# Patient Record
Sex: FEMALE | Race: WHITE
[De-identification: names, ages, dates, MRNs, and addresses within clinical notes are randomized per-mention and may not be internally consistent; named-entity substitution may affect disease eponyms.]

---

## 2021-01-12 ENCOUNTER — HOSPITAL ENCOUNTER (EMERGENCY)
Dept: HOSPITAL 61 - ER | Age: 46
Discharge: HOME | End: 2021-01-12
Payer: SELF-PAY

## 2021-01-12 VITALS — BODY MASS INDEX: 44.41 KG/M2 | HEIGHT: 64 IN | WEIGHT: 260.15 LBS

## 2021-01-12 VITALS — DIASTOLIC BLOOD PRESSURE: 87 MMHG | SYSTOLIC BLOOD PRESSURE: 181 MMHG

## 2021-01-12 DIAGNOSIS — J02.9: Primary | ICD-10-CM

## 2021-01-12 DIAGNOSIS — Z90.89: ICD-10-CM

## 2021-01-12 DIAGNOSIS — F17.200: ICD-10-CM

## 2021-01-12 DIAGNOSIS — K21.9: ICD-10-CM

## 2021-01-12 DIAGNOSIS — Z90.49: ICD-10-CM

## 2021-01-12 DIAGNOSIS — Z98.51: ICD-10-CM

## 2021-01-12 DIAGNOSIS — L53.9: ICD-10-CM

## 2021-01-12 PROCEDURE — 87880 STREP A ASSAY W/OPTIC: CPT

## 2021-01-12 PROCEDURE — 99283 EMERGENCY DEPT VISIT LOW MDM: CPT

## 2021-01-12 PROCEDURE — 87070 CULTURE OTHR SPECIMN AEROBIC: CPT

## 2021-01-12 NOTE — ED.ADGEN
Past Medical History


Past Medical History:  GERD


Past Surgical History:  Appendectomy, Cholecystectomy, Tonsillectomy, Tubal 

ligation


Smoking Status:  Current Every Day Smoker


Alcohol Use:  Occasionally


Drug Use:  None





General Adult


EDM:


Chief Complaint:  SORE THROAT





HPI:


HPI:





Patient is a 45  year old female who presents emergency department with 

complaints of a sore throat for the last week and a half.  She denies any 

difficulty speaking or swallowing.  Patient states it is very painful when she 

swallows.  She denies any fever, ear pain, shortness of breath, wheezing, cough,

body aches, fatigue, headache, abdominal pain, nausea, vomiting, or diarrhea.  

She currently rates pain a 10 out of 10 on the pain scale, she denies any 

alleviating factors.





Review of Systems:


Review of Systems:


Complete ROS is negative unless otherwise noted in HPI.





Allergies:


Allergies:





Allergies








Coded Allergies Type Severity Reaction Last Updated Verified


 


  No Known Drug Allergies    12/13/18 No











Physical Exam:


PE:


See Above


Constitutional: Well developed, well nourished, no acute distress, non-toxic 

appearance. []


HENT: Normocephalic, atraumatic, bilateral external ears normal, nose normal; 

erythema of posterior pharynx noted, no exudate, no tonsils.


Eyes: PERRLA, EOMI, conjunctiva normal, no discharge. [] 


Neck: Normal range of motion, supple, no stridor. [] 


Cardiovascular:Heart rate regular rhythm


Lungs & Thorax:  Respirations even and unlabored, no retractions, no respiratory

 distress


Skin: Warm, dry, no erythema, no rash. [] 


Extremities: No cyanosis, ROM intact, no edema. [] 


Neurologic: Alert and oriented X 3, no focal deficits noted. []


Psychologic: Affect normal, judgement normal, mood normal. []





Current Patient Data:


Vital Signs:





                                   Vital Signs








  Date Time  Temp Pulse Resp B/P (MAP) Pulse Ox O2 Delivery O2 Flow Rate FiO2


 


1/12/21 10:17 97.9 79 18 181/87 (118) 96 Room Air  





 97.9       











EKG:


EKG:


[]





Heart Score:


Risk Factors:


Risk Factors:  DM, Current or recent (<one month) smoker, HTN, HLP, family 

history of CAD, obesity.


Risk Scores:


Score 0 - 3:  2.5% MACE over next 6 weeks - Discharge Home


Score 4 - 6:  20.3% MACE over next 6 weeks - Admit for Clinical Observation


Score 7 - 10:  72.7% MACE over next 6 weeks - Early Invasive Strategies





Radiology/Procedures:


Radiology/Procedures:


[]





Course & Med Decision Making:


Course & Med Decision Making


Pertinent Labs and Imaging studies reviewed. (See chart for details)


Rapid strep is negative.


Prescription written for Medrol Dosepak.  Patient was encouraged to do warm salt

 water gargles, take Tylenol and ibuprofen as needed for pain.  Follow-up with 

primary care doctor if symptoms persist, return to the ER symptoms worsen.  Also

 encouraged the patient to stop smoking.





Patient verbalized an understanding of home care, medications, follow-up, and 

return to ED instructions and was in agreement with the plan of care.





[]





Dragon Disclaimer:


Dragon Disclaimer:


This electronic medical record was generated, in whole or in part, using a voice

 recognition dictation system.





Departure


Departure


Impression:  


   Primary Impression:  


   Acute pharyngitis


Disposition:  01 DC HOME SELF CARE/HOMELESS


Condition:  STABLE


Referrals:  


NO PCP (PCP)


Patient Instructions:  Viral and Bacterial Pharyngitis, Easy-to-Read





Additional Instructions:  


Fill prescription and use as directed. Recommend warm salt water gargles as 

needed for relief of discomfort. Alternate Tylenol and ibuprofen as needed for 

fever/pain. Follow-up with primary care doctor if symptoms persist. Return to 

the ER if symptoms worsen.





[facility]


[address] [place]  [address]


[phone]





[facility]


[address] [place]  [address]


[phone]





[facility]


[address]


[place]  [address]


[phone]





[facility]


[address]


[place]  [address]


[phone]





[facility]


[address] [place]  [address]


[phone]





[facility]


[address] [place]  [address]


[phone]





[facility]


[address]


[place]  [address]


[phone]





[facility]


[address]


[place]  [address]


[phone]





[facility] 


[address]


[place]  [address]


[phone]





[facility]


[address] [place]  [address]


[phone]


Scripts


Methylprednisolone (MEDROL) 4 Mg Tab.ds.pk


1 PKG PO UD for 6 Days, #1 PKG 0 Refills


   Prov: MINDI ASTORGA APRN         1/12/21





Problem Qualifiers








   Primary Impression:  


   Acute pharyngitis


   Pharyngitis/tonsillitis etiology:  unspecified etiology  Qualified Codes:  

   J02.9 - Acute pharyngitis, unspecified








MINDI ASTORGA APRN       Jan 12, 2021 10:23

## 2021-05-28 ENCOUNTER — HOSPITAL ENCOUNTER (EMERGENCY)
Dept: HOSPITAL 61 - ER | Age: 46
Discharge: HOME | End: 2021-05-28
Payer: SELF-PAY

## 2021-05-28 VITALS — WEIGHT: 245.37 LBS | BODY MASS INDEX: 41.89 KG/M2 | HEIGHT: 64 IN

## 2021-05-28 VITALS — DIASTOLIC BLOOD PRESSURE: 86 MMHG | SYSTOLIC BLOOD PRESSURE: 196 MMHG

## 2021-05-28 DIAGNOSIS — S20.212A: Primary | ICD-10-CM
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
## 2021-05-28 DIAGNOSIS — K21.9: ICD-10-CM

## 2021-05-28 DIAGNOSIS — E11.65: ICD-10-CM

## 2021-05-28 DIAGNOSIS — F17.200: ICD-10-CM
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
## 2021-05-28 DIAGNOSIS — I10: ICD-10-CM
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
## 2021-05-28 LAB
ANION GAP SERPL CALC-SCNC: 11 MMOL/L (ref 6–14)
BUN SERPL-MCNC: 11 MG/DL (ref 7–20)
CALCIUM SERPL-MCNC: 9.2 MG/DL (ref 8.5–10.1)
CHLORIDE SERPL-SCNC: 103 MMOL/L (ref 98–107)
CO2 SERPL-SCNC: 27 MMOL/L (ref 21–32)
CREAT SERPL-MCNC: 0.7 MG/DL (ref 0.6–1)
GFR SERPLBLD BASED ON 1.73 SQ M-ARVRAT: 90.5 ML/MIN
GLUCOSE SERPL-MCNC: 244 MG/DL (ref 70–99)
POTASSIUM SERPL-SCNC: 3.5 MMOL/L (ref 3.5–5.1)
SODIUM SERPL-SCNC: 141 MMOL/L (ref 136–145)

## 2021-05-28 PROCEDURE — 80048 BASIC METABOLIC PNL TOTAL CA: CPT

## 2021-05-28 PROCEDURE — 36415 COLL VENOUS BLD VENIPUNCTURE: CPT

## 2021-05-28 PROCEDURE — 71250 CT THORAX DX C-: CPT

## 2021-05-28 PROCEDURE — 99284 EMERGENCY DEPT VISIT MOD MDM: CPT

## 2021-05-28 RX ADMIN — LIDOCAINE SCH PATCH: 50 PATCH CUTANEOUS at 08:02

## 2021-05-28 NOTE — RAD
CT THORAX WO 



INDICATION: left anterior lower rib pain s/p blunt injury on monday 



COMPARISON STUDY: None.



TECHNIQUE:  Unenhanced axial images were obtained through the lungs and upper abdomen. Coronal and sa
gittal multiplanar reconstructions were also obtained.



PQRS compliance statement:



One or more of the following individualized dose reduction techniques were utilized for this examinat
ion:

1. Automated exposure control

2. Adjustment of the mA and/or kV according to patient size

3. Use of iterative reconstruction technique



FINDINGS:



Lungs and Airways: No pulmonary mass or consolidation. Normal central airways.



Pleura: The pleural spaces are normal.



Heart and Mediastinum: Thyroid isthmus 2 cm nodule. No axillary or supraclavicular lymphadenopathy. N
o mediastinal, hilar or retrocrural lymphadenopathy. The heart and pericardium are within normal limi
ts. The great vessels of the thorax are normal.



Abdomen: Cholecystectomy. Nonobstructive 3 mm renal calculus.



Bones and Soft Tissues: The visualized skeletal structures and soft tissues of the chest wall are wit
hin normal limits.



IMPRESSION:  

1. No acute fracture. No traumatic mediastinal or lung injury.

2. Thyroid isthmus 2 cm nodule can be further evaluated with thyroid ultrasound if not performed prev
iously.

3. Nonobstructive right renal 3 mm calculus.



Electronically signed by: Seth Faith MD (5/28/2021 8:20 AM) [id number]

## 2021-05-28 NOTE — PHYS DOC
Past Medical History


Past Medical History:  GERD


Past Surgical History:  Appendectomy, Cholecystectomy, Tonsillectomy, Tubal 

ligation


Smoking Status:  Current Every Day Smoker


Alcohol Use:  Occasionally


Drug Use:  None





General Adult


EDM:


Chief Complaint:  MECHANICAL FALL





HPI:


HPI:


45-year-old female past medical history of tobacco dependence/copd, takes no 

routine medications/no anticoagulants, presents to the ED with complaints of 

left lower anterior rib pain that started after patient fell forward on Monday 

while taking her dog out.  Patient reports her dog saw another dog while she was

holding the leash with her left hand and her dog lunged forward.  Patient landed

forward on her left side, rolled her ankle, bruised her left wrist and landed on

her left lower ribs.  States anytime she sneezes, coughs or gets out of the car 

(door dash ) her ribs hurt her, is unable to sleep. NKDA. Was not 

intoxicated/under the influence of any alcohol or drugs during initial injury. 

Tetanus is UTD.





Review of Systems:


Review of Systems:


Constitutional:   Denies fever or chills. []


Eyes:   Denies change in visual acuity. []


HENT:   Denies nasal congestion or sore throat. [] 


Respiratory:   Denies persistent cough or shortness of breath or hemoptysis 


Cardiovascular:   Denies chest pain or edema or syncope


GI:   Denies abdominal pain, nausea, vomiting, bloody stools or diarrhea. [] 


:  Denies dysuriaor hematuria or vaginal bleeding


Musculoskeletal:   Denies midline back pain or joint pain or radiculopathy or 

saddle anesthesia 


Integument:   Denies rash or diaphoresis


Neurologic:   Denies headache, midline neck pain, focal weakness or sensory 

changes. [] 


Endocrine:   Denies polyuria or polydipsia. [] 


Lymphatic:  Denies swollen glands. [] 


Psychiatric:  Denies depression or anxiety. []





Heart Score:


C/O Chest Pain:  No


Risk Factors:


Risk Factors:  DM, Current or recent (<one month) smoker, HTN, HLP, family 

history of CAD, obesity.


Risk Scores:


Score 0 - 3:  2.5% MACE over next 6 weeks - Discharge Home


Score 4 - 6:  20.3% MACE over next 6 weeks - Admit for Clinical Observation


Score 7 - 10:  72.7% MACE over next 6 weeks - Early Invasive Strategies





Allergies:


Allergies:





Allergies








Coded Allergies Type Severity Reaction Last Updated Verified


 


  No Known Drug Allergies    18 No











Physical Exam:


PE:





Constitutional: Well developed, well nourished, no acute distress, non-toxic a

ppearance, hypertensive-takes no meds


HENT: Normocephalic, atraumatic, no midline neck pain


Eyes: EOMI, conjunctiva normal, no discharge.  


Neck: Normal range of motion,  supple, 


Cardiovascular: S1/2 present, regular rhythm


Lungs & Thorax: Speaking in full sentences, bilateral equal chest rise, no 

tachypnea or increased work of breathing, splinting/in discomfort with deep 

breaths/dry cough, no subcutaneous emphysema or flail chest


Abdomen:  soft, no tenderness, 


Skin: Warm, dry, no erythema, no rash, no petechiae or bruising


Back: No midline tenderness or step offs, no CVA tenderness. [] 


Extremities: No tenderness, no cyanosis, no lower extremity edema, normal ankle 

exams bl


Neurologic: Alert and oriented X 3, normal motor function, normal sensory 

function, no focal deficits noted. []


Psychologic: Affect normal, judgement normal, mood normal. []





EKG:


EKG:


[]





Radiology/Procedures:


Radiology/Procedures:


                                        


                                 IMAGING REPORT





                                     Signed





PATIENT: SIGRID PALMA  ACCOUNT: [id number]     MRN#: [id number]


: 1975           LOCATION: ER              AGE: 45


SEX: F                    EXAM DT: 21         ACCESSION#: [id number]


STATUS: REG ER            ORD. PHYSICIAN: SIGRID VARNER DO


REASON: left anterior lower rib pain s/p blunt injjury on monday


PROCEDURE: CT CHEST WO CONTRAST





CT THORAX WO 





INDICATION: left anterior lower rib pain s/p blunt injury on monday 





COMPARISON STUDY: None.





TECHNIQUE:  Unenhanced axial images were obtained through the lungs and upper 

abdomen. Coronal and sagittal multiplanar reconstructions were also obtained.





PQRS compliance statement:





One or more of the following individualized dose reduction techniques were 

utilized for this examination:


1. Automated exposure control


2. Adjustment of the mA and/or kV according to patient size


3. Use of iterative reconstruction technique





FINDINGS:





Lungs and Airways: No pulmonary mass or consolidation. Normal central airways.





Pleura: The pleural spaces are normal.





Heart and Mediastinum: Thyroid isthmus 2 cm nodule. No axillary or 

supraclavicular lymphadenopathy. No mediastinal, hilar or retrocrural 

lymphadenopathy. The heart and pericardium are within normal limits. The great 

vessels of the thorax are normal.





Abdomen: Cholecystectomy. Nonobstructive 3 mm renal calculus.





Bones and Soft Tissues: The visualized skeletal structures and soft tissues of 

the chest wall are within normal limits.





IMPRESSION:  


1. No acute fracture. No traumatic mediastinal or lung injury.


2. Thyroid isthmus 2 cm nodule can be further evaluated with thyroid ultrasound 

if not performed previously.


3. Nonobstructive right renal 3 mm calculus.





Electronically signed by: Marshall Peters MD (2021 8:20 AM) [id number]














DICTATED and SIGNED BY:     MARSHALL PETERS MD


DATE:     21 1447CSL8 0





Course & Med Decision Making:


Course & Med Decision Making


Pertinent Labs and Imaging studies reviewed. (See chart for details)





Concern for asymptomatic hypertension in the setting of pain/rib contusions.  

Incidental finding of hyperglycemia-pt informed on concern for diabetes and need

 for urgent repeat testing w/pcp. Will discharge home with strict ED return 

precautions were given for tractable nausea or vomiting, chest pain, hemoptysis 

or pressure, syncope neurologic deficits. Encouraged urgent outpatient follow-up

 with PMD in 24 to 48 hours for reevaluation, blood pressure and diabetes 

management.  Life-threatening processes were considered but are low suspicion at

 this time, given history, physical exam and ED workup. Pt was educated on all 

prescription medications and adverse effects.  All patient's questions were 

answered and pt was stable at time of discharge.





Life/limb-threatening differential includes but is not limited to, intracranial 

hemorrhage, diffuse axonal injury, spinal cord syndrome, unstable cervical 

fracture or SCIWORA, fractures or joint dislocations, neurovascular injuries, 

organ injury or laceration, pneumothorax, pneumoperitoneum, pericardial 

tamponade, unstable pelvic fracture, compartment syndrome, flail chest or 

respiratory distress, burn injury or asphyxiation





I spoken with the patient and her caregivers.  I explained the patient's 

condition, diagnoses and treatment plan based on the information available to me

 at this time.  I have answered the patient and her caregiver's questions and 

addressed any concerns.  The patient and her caregivers have a good 

understanding of patient's diagnosis, condition and treatment plan as can be 

expected at this point.  Vital signs have been stable.  Patient's condition is 

stable and appropriate for discharge from the emergency department. 





Patient will pursue further outpatient evaluation with primary care physician or

 other designated or consulting physician as outlined in the discharge inst

ructions.  The patient and/or caregivers are agreeable to this plan of care and 

follow-up instructions have been explained in detail.  The patient and/or 

caregivers have received these instructions in written form and have expressed 

an understanding of the discharge instructions.  The patient and/or caregivers 

are aware that any significant change of condition or worsening of symptoms 

should prompt immediate return to this or the closest emergency department or 

call to 911.





Erinn Disclaimer:


Dragon Disclaimer:


This electronic medical record was generated, in whole or in part, using a voice

 recognition dictation system.





Departure


Departure


Impression:  


   Primary Impression:  


   Uncontrolled hypertension


   Additional Impressions:  


   Hyperglycemia


   Contusion of rib on left side


Disposition:  01 HOME / SELF CARE / HOMELESS


Condition:  STABLE


Referrals:  


NO PCP (PCP)


Follow-up with your pcp in 24-48 hours for reevaluation, diabetes and blood 

pressure management


Patient Instructions:  Diabetes, FAQs, Hypertension, Rib Contusion





Additional Instructions:  


Is unable to follow-up with your primary care physician call for appt at:


FOLLOW UP WITH FAMILY MEDICINE:


[address], [address]


[place] [address]


Phone: [phone]





EMERGENCY DEPARTMENT GENERAL DISCHARGE INSTRUCTIONS





Thank you for coming to [facility] Emergency Department (ED) 

today and 


trusting us with you care.  We trust that you had a positive experience in our 

Emergency 


Department.  If you wish to speak to the department management, you may call the

 Director at 


[phone].





YOUR FOLLOW UP INSTRUCTIONS ARE AS FOLLOWS:





1.  Do you have a private Doctor?  If you do not have a private doctor, please 

ask for a 


resource list of physicians or clinics that may be able to assist you with 

follow up care.





2.  The Emergency Physicain has interpreted your x-rays.  The X-Ray specialist 

will also 


review them.  If there is a change in the findings, you will be notified in 48 

hours when at 


all possible.





3.  A lab test or culture has been done, your results will be reviewed and you 

will be 


notified if you need a change in treatment.





ADDITIONAL INSTRUCTIONS AND INFORMATION:





1.  Your care today has been supervised by a physician who is specially trained 

in emergency 


care.  Many problems require more than one evaluation for a complete diagnosis 

and 


treatment.  We recommend that you schedule your follow up appointment as 

recommended to 


ensure complete treatment of you illness or injury.  If you are unable to obtain

 follow up 


care and continue to have a problem, or if your condition worsens, we recommend 

that you 


return to the ED.





2.  We are not able to safely determine your condition over the phone nor are we

 able to 


give sound medical advice over the phone.  For these safety reasons, if you call

 for medical 


advice we will ask you to come to the ED for further evaluation.





3.  If you have any questions regarding these discharge instructions please call

 the ED at 


[phone].





SAFETY INFORMATION:





In the interest of safety, wellness, and injury prevention; we encourage you to 

wear your 


sealbelt, if you smoke; quite smoking, and we encourage family to use a 

protective helmet 


for bicycling and other sporting events that present an increased risk for head 

injury.





IF YOUR SYMPTOMS WORSEN OR NEW SYMPTOMS DEVELOP, OR YOU HAVE CONCERNS ABOUT YOUR

 CONDITION; 


OR IF YOUR CONDITION WORSENS WHILE YOU ARE WAITING FOR YOUR FOLLOW UP 

APPOINTMENT; EITHER 


CONTACT YOUR PRIMARY CARE DOCTOR, THE PHYSICIAN WHOSE NAME AND NUMBER YOU WERE 

GIVEN, OR 


RETURN TO THE ED IMMEDIATELY.


Scripts


Hydrocodone Bit/Acetaminophen (HYDROCODONE-APAP 5-325  **) 1 Tab Tablet


1 TAB PO PRN Q6HRS PRN for PAIN for 2 Days, #8 TAB 0 Refills


   Avoid alcohol, driving or operating heavy machinery with


   this medication, this medication causes constipation and


   can be addictive


   Prov: SIGRID VARNER DO         21 


Lidocaine (Lido Jose Ramon) 1 Each Adh..patch


1 EACH TP DAILY for 5 Days, #5 PATCH


   Apply 1 patch for 12 hours, remove for another 12 hours.


   May repeat, 1 patch per day as instructed above.


   Prov: SIGRID VARNER DO         21











SIGRID VARNER DO               May 28, 2021 07:25

## 2021-10-19 ENCOUNTER — HOSPITAL ENCOUNTER (EMERGENCY)
Dept: HOSPITAL 61 - ER | Age: 46
Discharge: HOME | End: 2021-10-19
Payer: SELF-PAY

## 2021-10-19 VITALS — SYSTOLIC BLOOD PRESSURE: 132 MMHG | DIASTOLIC BLOOD PRESSURE: 60 MMHG

## 2021-10-19 VITALS — BODY MASS INDEX: 36.58 KG/M2 | WEIGHT: 214.29 LBS | HEIGHT: 64 IN

## 2021-10-19 DIAGNOSIS — K21.9: ICD-10-CM

## 2021-10-19 DIAGNOSIS — Z98.51: ICD-10-CM

## 2021-10-19 DIAGNOSIS — Z90.89: ICD-10-CM

## 2021-10-19 DIAGNOSIS — Z90.49: ICD-10-CM

## 2021-10-19 DIAGNOSIS — F17.200: ICD-10-CM

## 2021-10-19 DIAGNOSIS — M54.50: Primary | ICD-10-CM

## 2021-10-19 PROCEDURE — 96372 THER/PROPH/DIAG INJ SC/IM: CPT

## 2021-10-19 PROCEDURE — 99283 EMERGENCY DEPT VISIT LOW MDM: CPT

## 2021-10-19 NOTE — PHYS DOC
Past Medical History


Past Medical History:  GERD


Past Surgical History:  Appendectomy, Cholecystectomy, Tonsillectomy, Tubal 

ligation


Smoking Status:  Current Every Day Smoker


Additional Information:  


0.5 PPD


Alcohol Use:  Occasionally


Drug Use:  None





General Adult


EDM:


Chief Complaint:  LOWER BACK PAIN OR INJURY





HPI:


HPI:





Patient is a 46  year old female who presents with right-sided low back pain.  

Symptoms began a few days ago.  The pain is worse with movement and bending.  No

radiation of pain.  No lower extremity pain.  No numbness, tingling, or motor 

weakness.  No urinary symptoms reported.  No abdominal pain.  No fever or 

chills.  No stool or urine incontinence reported.  No fall, trauma or specific 

injury.  She does perform housekeeping duties at work.  She called into work 

this morning and is requesting a work note for today.  She has a previous 

history of some back pain issues, but no reported chronic back pain.  No 

previous known specific back injury or spine injury.  She has taken 

over-the-counter medication with little relief.





Review of Systems:


Review of Systems:


Constitutional:   Denies fever or chills. []


Eyes:   Denies change in visual acuity. []


HENT:   Denies nasal congestion or sore throat. [] 


Respiratory:   Denies cough or shortness of breath. [] 


Cardiovascular:   Denies chest pain or edema. [] 


GI:   Denies abdominal pain, nausea, vomiting, bloody stools or diarrhea.  

Denies incontinence.


:  Denies any urinary symptoms.  Denies incontinence.


Musculoskeletal:   Ports right-sided low back pain.  Denies joint pain or 

swelling.


Integument:   Denies rash. [] 


Neurologic:   Denies headache, focal weakness or sensory changes. [] 


Psychiatric:  Denies depression or anxiety. []





Heart Score:


C/O Chest Pain:  No


Risk Factors:


Risk Factors:  DM, Current or recent (<one month) smoker, HTN, HLP, family 

history of CAD, obesity.


Risk Scores:


Score 0 - 3:  2.5% MACE over next 6 weeks - Discharge Home


Score 4 - 6:  20.3% MACE over next 6 weeks - Admit for Clinical Observation


Score 7 - 10:  72.7% MACE over next 6 weeks - Early Invasive Strategies





Allergies:


Allergies:





Allergies








Coded Allergies Type Severity Reaction Last Updated Verified


 


  No Known Drug Allergies    12/13/18 No











Physical Exam:


PE:





Constitutional: Well developed, well nourished, no acute distress, non-toxic 

appearance. []


HENT: Normocephalic, atraumatic


Eyes: Sclera are clear and nonicteric, no discharge. [] 


Neck: Trachea is midline


Cardiovascular: No peripheral edema, well-perfused appearing.  +2 posterior 

tibial pulses and radial pulses bilaterally.


Lungs & Thorax: Respirations are nonlabored.


Abdomen: Bowel sounds normal, soft, no tenderness, no CVA tenderness.


Skin: Warm, dry, no erythema, no rash. [] 


Back: No midline tenderness or step-offs.  No deformity.  Right-sided lumbar 

paraspinal muscle soft tissue tenderness noted.  Pain is reproduced with forward

 bending/flexion of the lumbar spine.  Pelvis is stable.


Extremities: No tenderness, no cyanosis, no clubbing, ROM intact, no edema.  No 

calf tenderness.


Neurologic: Alert and oriented X 3, normal motor function, normal sensory 

function, no focal deficits noted.  5 out of 5 motor strength bilateral lower 

extremities.  DTRs 2 out of 4 bilateral lower extremities.  No foot drop.


Psychologic: Affect normal, judgement normal, mood normal. []





Current Patient Data:


Vital Signs:





                                   Vital Signs








  Date Time  Temp Pulse Resp B/P (MAP) Pulse Ox O2 Delivery O2 Flow Rate FiO2


 


10/19/21 08:42 98.3 65 19 132/60 (84) 95 Room Air  





 98.3       











EKG:


EKG:


[]





Radiology/Procedures:


Radiology/Procedures:


[]





Course & Med Decision Making:


Course & Med Decision Making


IM Toradol and lidocaine patch ordered for here.  No current indication for 

emergent imaging or further invasive exams based on current clinical 

presentation and symptoms.  I discussed home care instructions including 

alternating ice and heat, gentle stretching.  Work note is provided for today.  

I recommend she contact her primary care physician for any persistent pain.  She

 is comfortable with plan for discharge home.  Return precautions are given.  

She verbalizes understanding.





Dragon Disclaimer:


Dragon Disclaimer:


This electronic medical record was generated, in whole or in part, using a voice

 recognition dictation system.





Departure


Departure


Impression:  


   Primary Impression:  


   Right low back pain


Disposition:  01 HOME / SELF CARE / HOMELESS


Condition:  STABLE


Referrals:  


NO PCP (PCP)


Patient Instructions:  Back Exercises, Back Pain, Adult





Additional Instructions:  


Use the medication as directed/as needed. Ice and heat may help as well. Gentle 

stretching may help. Return to the ER for any acute trauma or injury, numbness 

tingling or motor weakness, loss of bowel or bladder function, burning or pain 

with urination, fever of 100.4 or higher or any other concerns. Please contact 

your primary care physician for follow-up.


Scripts


Lidocaine (Lidocaine) 1 Each Adh..patch


1 EACH TP DAILY for pain, #10 PATCH


   Prov: ZEB REDMOND DO         10/19/21 


Cyclobenzaprine Hcl (CYCLOBENZAPRINE HCL) 10 Mg Tablet


1 TAB PO BID for muscle spasm, #14 TAB


   Prov: ZEB REDMOND DO         10/19/21











ZEB REDMOND DO             Oct 19, 2021 09:05